# Patient Record
Sex: FEMALE | URBAN - METROPOLITAN AREA
[De-identification: names, ages, dates, MRNs, and addresses within clinical notes are randomized per-mention and may not be internally consistent; named-entity substitution may affect disease eponyms.]

---

## 2023-12-06 ENCOUNTER — APPOINTMENT (OUTPATIENT)
Dept: URBAN - METROPOLITAN AREA CLINIC 214 | Age: 58
Setting detail: DERMATOLOGY
End: 2023-12-11

## 2023-12-06 DIAGNOSIS — L73.2 HIDRADENITIS SUPPURATIVA: ICD-10-CM

## 2023-12-06 PROCEDURE — OTHER ORDER TESTS: OTHER

## 2023-12-06 PROCEDURE — OTHER MIPS QUALITY: OTHER

## 2023-12-06 PROCEDURE — OTHER PRESCRIPTION MEDICATION MANAGEMENT: OTHER

## 2023-12-06 PROCEDURE — OTHER COUNSELING: OTHER

## 2023-12-06 PROCEDURE — 11900 INJECT SKIN LESIONS </W 7: CPT

## 2023-12-06 PROCEDURE — OTHER HUMIRA INITIATION: OTHER

## 2023-12-06 PROCEDURE — OTHER INTRALESIONAL KENALOG: OTHER

## 2023-12-06 PROCEDURE — 99204 OFFICE O/P NEW MOD 45 MIN: CPT | Mod: 25

## 2023-12-06 ASSESSMENT — LOCATION DETAILED DESCRIPTION DERM
LOCATION DETAILED: RIGHT MONS PUBIS
LOCATION DETAILED: LEFT MONS PUBIS
LOCATION DETAILED: RIGHT AXILLARY VAULT
LOCATION DETAILED: RIGHT LABIUM MAJUS

## 2023-12-06 ASSESSMENT — LOCATION ZONE DERM
LOCATION ZONE: VULVA
LOCATION ZONE: AXILLAE

## 2023-12-06 ASSESSMENT — LOCATION SIMPLE DESCRIPTION DERM
LOCATION SIMPLE: MONS PUBIS
LOCATION SIMPLE: RIGHT AXILLARY VAULT
LOCATION SIMPLE: LABIA MAJORA

## 2023-12-06 ASSESSMENT — HURLEY STAGE
IN YOUR EXPERIENCE, AMONG ALL PATIENTS YOU HAVE SEEN WITH THIS CONDITION, HOW SEVERE IS THIS PATIENT'S CONDITION?: HURLEY STAGE II: SINGLE OR MULTIPLE, WIDELY SEPARATED RECURRENT ABSCESSES WITH SINUS TRACT FORMATION AND SCARRING

## 2023-12-06 NOTE — PROCEDURE: INTRALESIONAL KENALOG
Kenalog Preparation: Kenalog
Concentration Of Kenalog Solution Injected (Mg/Ml): 10.0
Consent: The risks of atrophy were reviewed with the patient.
Detail Level: Detailed
How Many Mls Were Removed From The 80 Mg/Ml (5ml) Vial When Preparing The Injectable Solution?: 0
Show Inventory Tab: Hide
Bill For Wasted Drug (Kenalog)?: no
Treatment Number (Optional): 1
Kenalog Type Of Vial: Multiple Dose
Validate Note Data When Using Inventory: Yes
Ndc# For Kenalog Only: 6129-3948-90
Medical Necessity Clause: This procedure was medically necessary because the lesions that were treated were:
Administered By (Optional): Nay LEYVA
Total Volume (Ccs): 0.3

## 2023-12-06 NOTE — HPI: BOILS
How Severe Are Your Boils?: severe
Is This A New Presentation, Or A Follow-Up?: Lakeisha
Additional History: Flaring in groin for 2 years. Flares under arms her whole life. Lesions are smaller now, but states these need drainage. Flares monthly. Also flares on breasts and buttocks. Patient has had total hysterectomy, both ovaries removed in 2009. No change in flares post menopause. \\nHas take Doxycycline, topical Clindamcyin, Triamcinolone, and his cleanse all with a poor response in past\\n\\nNo autoimmune disease. No rheumatoid arthritis. No MS, motor disease.
Improved

## 2023-12-06 NOTE — PROCEDURE: HUMIRA INITIATION
Add Pregnancy And Lactation Warning To Medication Counseling?: No
Humira Dosing: 80 mg SC day 0, 40 mg SC day 7, then 40 mg SC every other week
Humira Monitoring Guidelines: A yearly test for tuberculosis is required while taking Humira.
Diagnosis (Required): Hidradenitis Suppurativa
Pregnancy And Lactation Warning Text: This medication is Pregnancy Category B and is considered safe during pregnancy. It is unknown if this medication is excreted in breast milk.
Detail Level: Zone

## 2023-12-06 NOTE — PROCEDURE: ORDER TESTS
Billing Type: Third-Party Bill
Expected Date Of Service: 12/06/2023
Bill For Surgical Tray: no
Performing Laboratory: [id number]
Lab Facility: [id number]

## 2023-12-13 ENCOUNTER — RX ONLY (RX ONLY)
Age: 58
End: 2023-12-13

## 2023-12-13 RX ORDER — ADALIMUMAB 80MG/0.8ML
KIT SUBCUTANEOUS
Qty: 3 | Refills: 0 | Status: ERX | COMMUNITY
Start: 2023-12-13

## 2023-12-27 ENCOUNTER — RX ONLY (RX ONLY)
Age: 58
End: 2023-12-27

## 2023-12-27 RX ORDER — ADALIMUMAB 80MG/0.8ML
KIT SUBCUTANEOUS
Qty: 1 | Refills: 5 | Status: ERX | COMMUNITY
Start: 2023-12-27